# Patient Record
Sex: FEMALE | Race: WHITE | NOT HISPANIC OR LATINO | Employment: FULL TIME | URBAN - METROPOLITAN AREA
[De-identification: names, ages, dates, MRNs, and addresses within clinical notes are randomized per-mention and may not be internally consistent; named-entity substitution may affect disease eponyms.]

---

## 2018-11-13 ENCOUNTER — HOSPITAL ENCOUNTER (EMERGENCY)
Facility: HOSPITAL | Age: 28
Discharge: HOME | End: 2018-11-13
Attending: EMERGENCY MEDICINE | Admitting: EMERGENCY MEDICINE
Payer: COMMERCIAL

## 2018-11-13 ENCOUNTER — APPOINTMENT (EMERGENCY)
Dept: RADIOLOGY | Facility: HOSPITAL | Age: 28
End: 2018-11-13
Payer: COMMERCIAL

## 2018-11-13 VITALS
RESPIRATION RATE: 16 BRPM | HEART RATE: 100 BPM | TEMPERATURE: 98.8 F | BODY MASS INDEX: 26.52 KG/M2 | HEIGHT: 66 IN | WEIGHT: 165 LBS | DIASTOLIC BLOOD PRESSURE: 71 MMHG | OXYGEN SATURATION: 98 % | SYSTOLIC BLOOD PRESSURE: 122 MMHG

## 2018-11-13 DIAGNOSIS — R00.0 SINUS TACHYCARDIA: Primary | ICD-10-CM

## 2018-11-13 LAB
ALBUMIN SERPL BCP-MCNC: 4.1 G/DL (ref 3.5–5)
ALP SERPL-CCNC: 59 U/L (ref 46–116)
ALT SERPL W P-5'-P-CCNC: 15 U/L (ref 12–78)
ANION GAP SERPL CALCULATED.3IONS-SCNC: 11 MMOL/L (ref 4–13)
AST SERPL W P-5'-P-CCNC: 9 U/L (ref 5–45)
ATRIAL RATE: 119 BPM
BASOPHILS # BLD AUTO: 0.05 THOUSANDS/ΜL (ref 0–0.1)
BASOPHILS NFR BLD AUTO: 1 % (ref 0–1)
BILIRUB SERPL-MCNC: 0.3 MG/DL (ref 0.2–1)
BILIRUB UR QL STRIP: NEGATIVE
BUN SERPL-MCNC: 10 MG/DL (ref 5–25)
CALCIUM SERPL-MCNC: 8.7 MG/DL (ref 8.3–10.1)
CHLORIDE SERPL-SCNC: 103 MMOL/L (ref 100–108)
CLARITY UR: NORMAL
CO2 SERPL-SCNC: 25 MMOL/L (ref 21–32)
COLOR UR: YELLOW
CREAT SERPL-MCNC: 0.63 MG/DL (ref 0.6–1.3)
DEPRECATED D DIMER PPP: <270 NG/ML (FEU)
EOSINOPHIL # BLD AUTO: 0.61 THOUSAND/ΜL (ref 0–0.61)
EOSINOPHIL NFR BLD AUTO: 7 % (ref 0–6)
ERYTHROCYTE [DISTWIDTH] IN BLOOD BY AUTOMATED COUNT: 12.9 % (ref 11.6–15.1)
EXT PREG TEST URINE: NORMAL
GFR SERPL CREATININE-BSD FRML MDRD: 123 ML/MIN/1.73SQ M
GLUCOSE SERPL-MCNC: 108 MG/DL (ref 65–140)
GLUCOSE UR STRIP-MCNC: NEGATIVE MG/DL
HCT VFR BLD AUTO: 39.8 % (ref 34.8–46.1)
HGB BLD-MCNC: 13.3 G/DL (ref 11.5–15.4)
HGB UR QL STRIP.AUTO: NEGATIVE
IMM GRANULOCYTES # BLD AUTO: 0.04 THOUSAND/UL (ref 0–0.2)
IMM GRANULOCYTES NFR BLD AUTO: 0 % (ref 0–2)
KETONES UR STRIP-MCNC: NEGATIVE MG/DL
LEUKOCYTE ESTERASE UR QL STRIP: NEGATIVE
LYMPHOCYTES # BLD AUTO: 1.86 THOUSANDS/ΜL (ref 0.6–4.47)
LYMPHOCYTES NFR BLD AUTO: 20 % (ref 14–44)
MCH RBC QN AUTO: 31.7 PG (ref 26.8–34.3)
MCHC RBC AUTO-ENTMCNC: 33.4 G/DL (ref 31.4–37.4)
MCV RBC AUTO: 95 FL (ref 82–98)
MONOCYTES # BLD AUTO: 0.48 THOUSAND/ΜL (ref 0.17–1.22)
MONOCYTES NFR BLD AUTO: 5 % (ref 4–12)
NEUTROPHILS # BLD AUTO: 6.36 THOUSANDS/ΜL (ref 1.85–7.62)
NEUTS SEG NFR BLD AUTO: 67 % (ref 43–75)
NITRITE UR QL STRIP: NEGATIVE
NRBC BLD AUTO-RTO: 0 /100 WBCS
P AXIS: 63 DEGREES
PH UR STRIP.AUTO: 7 [PH] (ref 4.5–8)
PLATELET # BLD AUTO: 278 THOUSANDS/UL (ref 149–390)
PMV BLD AUTO: 11.3 FL (ref 8.9–12.7)
POTASSIUM SERPL-SCNC: 3.2 MMOL/L (ref 3.5–5.3)
PR INTERVAL: 168 MS
PROT SERPL-MCNC: 7.9 G/DL (ref 6.4–8.2)
PROT UR STRIP-MCNC: NEGATIVE MG/DL
QRS AXIS: 85 DEGREES
QRSD INTERVAL: 82 MS
QT INTERVAL: 314 MS
QTC INTERVAL: 441 MS
RBC # BLD AUTO: 4.19 MILLION/UL (ref 3.81–5.12)
SODIUM SERPL-SCNC: 139 MMOL/L (ref 136–145)
SP GR UR STRIP.AUTO: <=1.005 (ref 1–1.03)
T WAVE AXIS: 66 DEGREES
TROPONIN I SERPL-MCNC: <0.02 NG/ML
TSH SERPL DL<=0.05 MIU/L-ACNC: 2.23 UIU/ML (ref 0.36–3.74)
UROBILINOGEN UR QL STRIP.AUTO: 0.2 E.U./DL
VENTRICULAR RATE: 119 BPM
WBC # BLD AUTO: 9.4 THOUSAND/UL (ref 4.31–10.16)

## 2018-11-13 PROCEDURE — 84443 ASSAY THYROID STIM HORMONE: CPT | Performed by: PHYSICIAN ASSISTANT

## 2018-11-13 PROCEDURE — 81003 URINALYSIS AUTO W/O SCOPE: CPT | Performed by: PHYSICIAN ASSISTANT

## 2018-11-13 PROCEDURE — 99285 EMERGENCY DEPT VISIT HI MDM: CPT

## 2018-11-13 PROCEDURE — 93005 ELECTROCARDIOGRAM TRACING: CPT

## 2018-11-13 PROCEDURE — 96361 HYDRATE IV INFUSION ADD-ON: CPT

## 2018-11-13 PROCEDURE — 71046 X-RAY EXAM CHEST 2 VIEWS: CPT

## 2018-11-13 PROCEDURE — 85379 FIBRIN DEGRADATION QUANT: CPT | Performed by: PHYSICIAN ASSISTANT

## 2018-11-13 PROCEDURE — 85025 COMPLETE CBC W/AUTO DIFF WBC: CPT | Performed by: PHYSICIAN ASSISTANT

## 2018-11-13 PROCEDURE — 84484 ASSAY OF TROPONIN QUANT: CPT | Performed by: PHYSICIAN ASSISTANT

## 2018-11-13 PROCEDURE — 36415 COLL VENOUS BLD VENIPUNCTURE: CPT | Performed by: PHYSICIAN ASSISTANT

## 2018-11-13 PROCEDURE — 93010 ELECTROCARDIOGRAM REPORT: CPT | Performed by: INTERNAL MEDICINE

## 2018-11-13 PROCEDURE — 81025 URINE PREGNANCY TEST: CPT | Performed by: PHYSICIAN ASSISTANT

## 2018-11-13 PROCEDURE — 80053 COMPREHEN METABOLIC PANEL: CPT | Performed by: PHYSICIAN ASSISTANT

## 2018-11-13 PROCEDURE — 96360 HYDRATION IV INFUSION INIT: CPT

## 2018-11-13 RX ORDER — POTASSIUM CHLORIDE 20 MEQ/1
40 TABLET, EXTENDED RELEASE ORAL ONCE
Status: COMPLETED | OUTPATIENT
Start: 2018-11-13 | End: 2018-11-13

## 2018-11-13 RX ADMIN — POTASSIUM CHLORIDE 40 MEQ: 1500 TABLET, EXTENDED RELEASE ORAL at 13:12

## 2018-11-13 RX ADMIN — SODIUM CHLORIDE 1000 ML: 0.9 INJECTION, SOLUTION INTRAVENOUS at 12:06

## 2018-11-13 NOTE — ED NOTES
Patient states to feeling lightheaded at this moment  Comes and goes she says  Denies nausea/ vomiting  Patient states that when the chest pressure occurs she feels hot and lightheaded  She feels her heart racing  Patient works in a greenhouse in the office but states that they sprayed chemicals on Friday        Stevie Nash RN  11/13/18 1200

## 2018-11-13 NOTE — DISCHARGE INSTRUCTIONS
Rest, increase fluids  Follow up with cardiologist or family doctor this week for recheck  Return to ER if symptoms worsen  Tachycardia   WHAT YOU NEED TO KNOW:   Tachycardia (fast heart rate) is when your heart rate is 100 beats per minute or more at rest  It is normal for the heart rate to increase with activity or exercise and then decrease when you stop  A fast heart rate at rest may be caused by any of the following:  · Anxiety, stress, or pain    · Fever    · Physical fatigue or strenuous exercise    · Large amounts of caffeine such as coffee, tea, and energy drinks    · Heavy alcohol use or cigarette smoking    · Some medicines, inhalers, or street drugs    · Increased thyroid hormone level  DISCHARGE INSTRUCTIONS:   Call 911 or have someone else call for any of the following:   · You have any of the following signs of a heart attack:     ¨ Squeezing, pressure, or pain in your chest that lasts longer than a few minutes  Chest pain may come and go  ¨ Pain in your jaw, neck, one or both arms, upper and lower back, or stomach  ¨ Shortness of breath, or panting    ¨ Nausea or vomiting    ¨ Lightheadedness    · You cannot be woken  Contact your healthcare provider if:   · Your pulse is faster than your healthcare provider said it should be  · You have frequent periods of a fast heart rate  · You feel weak or dizzy  · You have questions or concerns about your condition or care  Help prevent a fast heart rate:  · Decrease the amount of caffeine you drink  Caffeine can increase your heart rate  · Limit or do not drink alcohol  Alcohol can increase your heart rate  Ask your healthcare provider if it is safe for you to drink alcohol  Also ask how much is safe for you to drink  · Do not smoke  Nicotine and other chemicals in cigarettes can cause damage to your heart  Ask your healthcare provider for information if you currently smoke and need help to quit   E-cigarettes or smokeless tobacco still contain nicotine  Talk to your healthcare provider before you use these products  · Do not use illegal drugs  Drugs such as meth and cocaine can increase your heart rate  Talk to your healthcare provider if you use illegal drugs and want to quit  · Get more rest   Fatigue can cause your heart rate to increase  Ask your healthcare provider about the best exercise plan for you  · Learn ways to cope with stress  Stress, fear, and anxiety can cause a fast heart rate  Your healthcare provider may recommend relaxation techniques and deep breathing exercises  Your healthcare provider may recommend you talk to someone about your stress or anxiety, such as a counselor or a trusted friend  Check your pulse as directed: Your healthcare provider will show you how to check your pulse, and how often to check it  Write down how fast your pulse is and if it feels regular or like it is skipping beats  Also write down the activity you were doing if your heart rate is above 100  Bring the information with you to your follow-up appointment  Follow up with your healthcare provider as directed: You may need more tests  Write down your questions so you remember to ask them at your visit  © 2017 [facility] Information is for End User's use only and may not be sold, redistributed or otherwise used for commercial purposes  All illustrations and images included in CareNotes® are the copyrighted property of A D A M [employer] Inc  or [employer]  The above information is an  only  It is not intended as medical advice for individual conditions or treatments  Talk to your doctor, nurse or pharmacist before following any medical regimen to see if it is safe and effective for you

## 2018-11-13 NOTE — ED PROVIDER NOTES
History  Chief Complaint   Patient presents with    Palpitations     Patient states she has been having palpitations intermittently since Friday  Patient went to PCP for regular visit and her heart rate wa elevated as high as 149  Patient complaisn of chest pressure     Patient is a 34 y/o F with a history of seasonal allergies and anxiety that presents to the ED with heart palpitations that started 3 days ago  She states they have been coming and going  She was seen by her PCP this morning and found to have a heart rate in the 130s and sent to the ER  She states she has chest tightness,but denies SOB  SHe states she used to be on claritin D, but stopped taking it a couple days ago  SHe denies leg swelling or pain  No history of thyroid disease or blood clots  No recent surgeries, long trips, hormone therapy  She states the palpitations have been coming and going and she has been monitoring her heart rate at home and it did not go below 90  She does drink 1-2 cups of coffee a day           History provided by:  Patient  Palpitations   Palpitations quality:  Fast  Onset quality:  Sudden  Duration:  3 days  Timing:  Intermittent  Progression:  Unchanged  Chronicity:  New  Context: anxiety and caffeine    Context: not appetite suppressants and not dehydration    Relieved by:  Nothing  Worsened by:  Nothing  Ineffective treatments:  None tried  Associated symptoms: chest pressure    Associated symptoms: no back pain, no chest pain, no cough, no diaphoresis, no dizziness, no leg pain, no lower extremity edema, no nausea, no near-syncope, no numbness, no shortness of breath, no vomiting and no weakness    Risk factors: OTC sinus medications    Risk factors: no diabetes mellitus, no heart disease, no hx of atrial fibrillation, no hx of DVT, no hx of PE, no hx of thyroid disease, no hypercoagulable state, no hyperthyroidism and no stress        None       Past Medical History:   Diagnosis Date    Migraine History reviewed  No pertinent surgical history  History reviewed  No pertinent family history  I have reviewed and agree with the history as documented  Social History   Substance Use Topics    Smoking status: Never Smoker    Smokeless tobacco: Never Used    Alcohol use Yes        Review of Systems   Constitutional: Negative for diaphoresis and fever  HENT: Negative  Eyes: Negative for visual disturbance  Respiratory: Negative for cough and shortness of breath  Cardiovascular: Positive for palpitations  Negative for chest pain, leg swelling and near-syncope  Gastrointestinal: Negative for abdominal pain, blood in stool, nausea and vomiting  Musculoskeletal: Negative for back pain and neck pain  Skin: Negative for color change and rash  Neurological: Negative for dizziness, tremors, seizures, syncope, facial asymmetry, speech difficulty, weakness, light-headedness, numbness and headaches  Psychiatric/Behavioral: Negative for confusion  All other systems reviewed and are negative  Physical Exam  Physical Exam   Constitutional: She is oriented to person, place, and time  She appears well-developed and well-nourished  She is active and cooperative  She does not appear ill  No distress  HENT:   Head: Normocephalic and atraumatic  Right Ear: Hearing and tympanic membrane normal    Left Ear: Hearing and tympanic membrane normal    Nose: Nose normal    Mouth/Throat: Oropharynx is clear and moist and mucous membranes are normal    Eyes: Conjunctivae are normal    Neck: Normal range of motion  Neck supple  Cardiovascular: Regular rhythm and normal heart sounds  Tachycardia present  No murmur heard  Pulmonary/Chest: Effort normal and breath sounds normal  She has no wheezes  She has no rhonchi  She has no rales  Abdominal: Soft  Normal appearance and bowel sounds are normal  There is no tenderness  Musculoskeletal: Normal range of motion   She exhibits no edema or deformity  Neurological: She is alert and oriented to person, place, and time  She has normal strength  No sensory deficit  Gait normal    Skin: Skin is warm and dry  No rash noted  She is not diaphoretic  No pallor  Psychiatric: Her mood appears anxious  Nursing note and vitals reviewed  Vital Signs  ED Triage Vitals [11/13/18 1147]   Temperature Pulse Respirations Blood Pressure SpO2   98 8 °F (37 1 °C) (!) 134 20 152/81 100 %      Temp src Heart Rate Source Patient Position - Orthostatic VS BP Location FiO2 (%)   -- -- -- -- --      Pain Score       6           Vitals:    11/13/18 1215 11/13/18 1245 11/13/18 1300 11/13/18 1315   BP: 130/73 117/67 113/65 129/70   Pulse: (!) 111 104 (!) 112 (!) 107       Visual Acuity      ED Medications  Medications   sodium chloride 0 9 % bolus 1,000 mL (1,000 mL Intravenous New Bag 11/13/18 1206)   potassium chloride (K-DUR,KLOR-CON) CR tablet 40 mEq (40 mEq Oral Given 11/13/18 1312)       Diagnostic Studies  Results Reviewed     Procedure Component Value Units Date/Time    TSH [[id number]]  (Normal) Collected:  11/13/18 1205    Lab Status:  Final result Specimen:  Blood from Arm, Left Updated:  11/13/18 1245     TSH 3RD GENERATON 2 232 uIU/mL     Narrative:         Patients undergoing fluorescein dye angiography may retain small amounts of fluorescein in the body for 48-72 hours post procedure  Samples containing fluorescein can produce falsely depressed TSH values  If the patient had this procedure,a specimen should be resubmitted post fluorescein clearance            The recommended reference ranges for TSH during pregnancy are as follows:  First trimester 0 1 to 2 5 uIU/mL  Second trimester  0 2 to 3 0 uIU/mL  Third trimester 0 3 to 3 0 uIU/m      Comprehensive metabolic panel [[id number]]  (Abnormal) Collected:  11/13/18 1205    Lab Status:  Final result Specimen:  Blood from Arm, Left Updated:  11/13/18 1242     Sodium 139 mmol/L      Potassium 3 2 (L) mmol/L Chloride 103 mmol/L      CO2 25 mmol/L      ANION GAP 11 mmol/L      BUN 10 mg/dL      Creatinine 0 63 mg/dL      Glucose 108 mg/dL      Calcium 8 7 mg/dL      AST 9 U/L      ALT 15 U/L      Alkaline Phosphatase 59 U/L      Total Protein 7 9 g/dL      Albumin 4 1 g/dL      Total Bilirubin 0 30 mg/dL      eGFR 123 ml/min/1 73sq m     Narrative:         National Kidney Disease Education Program recommendations are as follows:  GFR calculation is accurate only with a steady state creatinine  Chronic Kidney disease less than 60 ml/min/1 73 sq  meters  Kidney failure less than 15 ml/min/1 73 sq  meters      Troponin I [[id number]]  (Normal) Collected:  11/13/18 1205    Lab Status:  Final result Specimen:  Blood from Arm, Left Updated:  11/13/18 1239     Troponin I <0 02 ng/mL     D-Dimer [[id number]]  (Normal) Collected:  11/13/18 1205    Lab Status:  Final result Specimen:  Blood from Arm, Left Updated:  11/13/18 1236     D-Dimer, Quant <270 ng/ml (FEU)     UA w Reflex to Microscopic w Reflex to Culture [[id number]] Collected:  11/13/18 1219    Lab Status:  Final result Specimen:  Urine from Urine, Clean Catch Updated:  11/13/18 1232     Color, UA Yellow     Clarity, UA Slightly Cloudy     Specific Gravity, UA <=1 005     pH, UA 7 0     Leukocytes, UA Negative     Nitrite, UA Negative     Protein, UA Negative mg/dl      Glucose, UA Negative mg/dl      Ketones, UA Negative mg/dl      Urobilinogen, UA 0 2 E U /dl      Bilirubin, UA Negative     Blood, UA Negative    POCT pregnancy, urine [[id number]]  (Normal) Resulted:  11/13/18 1220    Lab Status:  Final result Updated:  11/13/18 1220     EXT PREG TEST UR (Ref: Negative) neg    CBC and differential [[id number]]  (Abnormal) Collected:  11/13/18 1205    Lab Status:  Final result Specimen:  Blood from Arm, Left Updated:  11/13/18 1218     WBC 9 40 Thousand/uL      RBC 4 19 Million/uL      Hemoglobin 13 3 g/dL      Hematocrit 39 8 %      MCV 95 fL      MCH 31 7 pg      MCHC 33 4 g/dL      RDW 12 9 %      MPV 11 3 fL      Platelets 094 Thousands/uL      nRBC 0 /100 WBCs      Neutrophils Relative 67 %      Immat GRANS % 0 %      Lymphocytes Relative 20 %      Monocytes Relative 5 %      Eosinophils Relative 7 (H) %      Basophils Relative 1 %      Neutrophils Absolute 6 36 Thousands/µL      Immature Grans Absolute 0 04 Thousand/uL      Lymphocytes Absolute 1 86 Thousands/µL      Monocytes Absolute 0 48 Thousand/µL      Eosinophils Absolute 0 61 Thousand/µL      Basophils Absolute 0 05 Thousands/µL                  XR chest 2 views   Final Result by Kashmir Schneider MD (11/13 1301)      No acute cardiopulmonary disease  Workstation performed: [id number]                    Procedures  ECG 12 Lead Documentation  Date/Time: 11/13/2018 11:48 AM  Performed by: Pao Floyd by: David Cui     Indications / Diagnosis:  Tachycardia  Patient location:  ED  Previous ECG:     Previous ECG:  Unavailable  Rate:     ECG rate:  119  Rhythm:     Rhythm: sinus tachycardia    Conduction:     Conduction: normal    ST segments:     ST segments:  Normal  T waves:     T waves: normal             Phone Contacts  ED Phone Contact    ED Course                               MDM  Number of Diagnoses or Management Options  Sinus tachycardia: new and requires workup  Diagnosis management comments: Patient with sinus tachycardia, will check labs, thyroid, ekg, to r/o electrolyte abnormality, thyroid disease, infection  Patient's heart rate did improve while in the ER, will refer to cardiologist for possible holter monitor  D/w patient could be related to anxiety          Amount and/or Complexity of Data Reviewed  Clinical lab tests: ordered and reviewed  Tests in the radiology section of CPT®: ordered and reviewed  Independent visualization of images, tracings, or specimens: yes    Patient Progress  Patient progress: improved    CritCare Time    Disposition  Final diagnoses: Sinus tachycardia     Time reflects when diagnosis was documented in both MDM as applicable and the Disposition within this note     Time User Action Codes Description Comment    11/13/2018  1:36 PM Patti Valenzuela Add [R00 0] Sinus tachycardia       ED Disposition     ED Disposition Condition Comment    Discharge  Kisha Moran discharge to home/self care  Condition at discharge: Stable        Follow-up Information     Follow up With Specialties Details Why Jerman Mcdermott MD Cardiology Call today For recheck and further evaluation [address] [place] [address] [address] [place] [address]  [phone]            Patient's Medications    No medications on file     No discharge procedures on file      ED Provider  Electronically Signed by           Gloria Gaona PA-C  11/13/18 4091

## 2018-12-14 NOTE — PROGRESS NOTES
Cardiology Consultation     Haile Quintanilla  [id number]  1990  HEART & VASCULAR  [facility] Storm Cooley  [address] [place], [place] [id number]  1  Palpitation     2  Tachycardia       There is no problem list on file for this patient  HPI patient is here for a cardiology evaluation  She has a history of palpitations  She was recently seen in the emergency room in November  Her potassium was noted to be 3 2 when evaluated  EKG demonstrated sinus tachycardia at a rate of 119 and otherwise a normal tracing  TSH done during that visit was normal    She does have a history of seasonal allergies anxiety  Blood pressure today is good  We did do orthostatics and the blood pressure actually went up a bit upon standing  She previously was taking Claritin-D which is been discontinued  She was given metoprolol to use on a p r n  basis  She has not had to use this  She apparently had a Holter monitor done at the [facility] but I do not have access the results  She will forward this to me  She has felt better since the emergency room visit  She is here today with her mother  Her mother has a history of paroxysmal atrial fibrillation in the setting of thyroid disease  PMH-  Past Medical History:   Diagnosis Date    Migraine         SOCIAL HISTORY-  Social History     Social History    Marital status: Single     Spouse name: N/A    Number of children: N/A    Years of education: N/A     Occupational History    Not on file  Social History Main Topics    Smoking status: Never Smoker    Smokeless tobacco: Never Used    Alcohol use Yes    Drug use: No    Sexual activity: Not on file     Other Topics Concern    Not on file     Social History Narrative    No narrative on file        FAMILY HISTORY-  No family history on file  SURGICAL HISTORY-  No past surgical history on file        Current Outpatient Prescriptions:   Vicenta Moyer Oil OIL, by Does not apply route, Disp: , Rfl:     magnesium 30 MG tablet, Take 30 mg by mouth 2 (two) times a day, Disp: , Rfl:     metoprolol tartrate (LOPRESSOR) 25 mg tablet, TAKE 1 TABLET BY ORAL ROUTE 2 TIMES EVERY DAY AS NEEDED TACHYCARDIA, Disp: , Rfl: 0    peppermint oil, by Does not apply route as needed, Disp: , Rfl:     Probiotic Product (PROBIOTIC PO), Take by mouth, Disp: , Rfl:     prochlorperazine (COMPAZINE) 10 mg tablet, Take 10 mg by mouth every 8 (eight) hours as needed, Disp: , Rfl: 0    SUMAtriptan (IMITREX) 100 mg tablet, PLEASE SEE ATTACHED FOR DETAILED DIRECTIONS, Disp: , Rfl: 2  Allergies   Allergen Reactions    Pollen Extract      Vitals:    12/19/18 1330   BP: 108/70   BP Location: Right arm   Patient Position: Sitting   Cuff Size: Large   Pulse: 64   Weight: 73 9 kg (163 lb)   Height: 5' 6" (1 676 m)         Review of Systems:  Review of Systems   Cardiovascular: Positive for palpitations  All other systems reviewed and are negative  Physical Exam:  Physical Exam   Constitutional: She is oriented to person, place, and time  She appears well-developed and well-nourished  HENT:   Head: Normocephalic and atraumatic  Eyes: Pupils are equal, round, and reactive to light  Conjunctivae and EOM are normal    Neck: Normal range of motion  Neck supple  Cardiovascular: Normal rate and normal heart sounds  Pulmonary/Chest: Effort normal and breath sounds normal    Neurological: She is alert and oriented to person, place, and time  Skin: Skin is warm and dry  Psychiatric: She has a normal mood and affect  Vitals reviewed  Discussion/Summary: At this point I will check an echocardiogram   I was going to order a Holter monitor but she apparently had one done already and I will review the results of this  The patient will forward a copy of this to me  I have asked her to reduce her caffeine use which she has already done  She no longer uses Claritin-D    I do not think she needs a stress test at this point  I have asked her to call if there is a problem in the interim otherwise I will see her in follow-up in six months time

## 2018-12-19 ENCOUNTER — TRANSCRIBE ORDERS (OUTPATIENT)
Dept: ADMINISTRATIVE | Facility: HOSPITAL | Age: 28
End: 2018-12-19

## 2018-12-19 ENCOUNTER — OFFICE VISIT (OUTPATIENT)
Dept: CARDIOLOGY CLINIC | Facility: CLINIC | Age: 28
End: 2018-12-19
Payer: COMMERCIAL

## 2018-12-19 VITALS
WEIGHT: 163 LBS | SYSTOLIC BLOOD PRESSURE: 108 MMHG | BODY MASS INDEX: 26.2 KG/M2 | DIASTOLIC BLOOD PRESSURE: 70 MMHG | HEIGHT: 66 IN | HEART RATE: 64 BPM

## 2018-12-19 DIAGNOSIS — R00.0 TACHYCARDIA: ICD-10-CM

## 2018-12-19 DIAGNOSIS — R00.2 PALPITATION: Primary | ICD-10-CM

## 2018-12-19 PROCEDURE — 99244 OFF/OP CNSLTJ NEW/EST MOD 40: CPT | Performed by: INTERNAL MEDICINE

## 2018-12-19 RX ORDER — MAGNESIUM 30 MG
30 TABLET ORAL 2 TIMES DAILY
COMMUNITY

## 2018-12-19 RX ORDER — PEPPERMINT OIL
OIL (ML) MISCELLANEOUS AS NEEDED
COMMUNITY

## 2018-12-19 RX ORDER — SUMATRIPTAN 100 MG/1
TABLET, FILM COATED ORAL
Refills: 2 | COMMUNITY
Start: 2018-11-07

## 2018-12-19 RX ORDER — SALICYLIC ACID
POWDER (GRAM) MISCELLANEOUS
COMMUNITY

## 2018-12-19 RX ORDER — PROCHLORPERAZINE MALEATE 10 MG
10 TABLET ORAL EVERY 8 HOURS PRN
Refills: 0 | COMMUNITY
Start: 2018-10-31

## 2018-12-19 NOTE — PATIENT INSTRUCTIONS
I will order an echocardiogram   Please forward a copy of the Holter monitor to me  Please call if there is a problem in the interim otherwise I will see you at the follow-up visit

## 2019-01-04 ENCOUNTER — HOSPITAL ENCOUNTER (OUTPATIENT)
Dept: NON INVASIVE DIAGNOSTICS | Facility: CLINIC | Age: 29
Discharge: HOME | End: 2019-01-04
Payer: COMMERCIAL

## 2019-01-04 DIAGNOSIS — R00.2 PALPITATION: ICD-10-CM

## 2019-01-04 DIAGNOSIS — R00.0 TACHYCARDIA: ICD-10-CM

## 2019-01-04 PROCEDURE — 93306 TTE W/DOPPLER COMPLETE: CPT

## 2019-01-04 PROCEDURE — 93306 TTE W/DOPPLER COMPLETE: CPT | Performed by: INTERNAL MEDICINE

## 2019-07-09 NOTE — PROGRESS NOTES
Cardiology Follow Up    Zoila Crenshaw  1990  [id number]  [facility] [address]  [phone]    1  Palpitation  POCT ECG    Stress test only, exercise    CANCELED: POCT ECG   2  Tachycardia  Stress test only, exercise   3  Chest pain, unspecified type  POCT ECG    Stress test only, exercise       Interval History:  Patient is here for a follow-up visit  She was last seen by me in December of last year  Since that time she had an echocardiogram done in January which demonstrated preserved LV systolic function and no significant valvular heart disease  Prior Holter monitor done in November 14th of last year demonstrated an average heart rate of 96 with a maximal heart rate of 165  There were 957 PVCs with 85 ventricular couplets noted  There were 596 PACs with a 56 second episode of SVT at a rate of 145  The morphology of this is unclear  Since this study was done the patient did modify her caffeine intake  As well she was taking Claritin-D  As well she was hypokalemic at the time the study was done  EKG today demonstrates sinus rhythm at a rate of 83 with no significant change compared to a prior tracing done November 14, 2018 except the rate is better controlled today  She has had intermittent atypical chest discomfort  I have a low index of suspicion but she does have concerns  I will likely schedule a stress test   Her EKG as noted shows no ischemic change  She has had little issue with palpitation since modifying her caffeine use and discontinuing Claritin-D  There is no problem list on file for this patient      Past Medical History:   Diagnosis Date    Migraine      Social History     Socioeconomic History    Marital status: Single     Spouse name: Not on file    Number of children: Not on file    Years of education: Not on file    Highest education level: Not on file   Occupational History    Not on file   Social Needs    Financial resource strain: Not on file    Food insecurity:     Worry: Not on file     Inability: Not on file    Transportation needs:     Medical: Not on file     Non-medical: Not on file   Tobacco Use    Smoking status: Never Smoker    Smokeless tobacco: Never Used   Substance and Sexual Activity    Alcohol use: Yes    Drug use: No    Sexual activity: Not on file   Lifestyle    Physical activity:     Days per week: Not on file     Minutes per session: Not on file    Stress: Not on file   Relationships    Social connections:     Talks on phone: Not on file     Gets together: Not on file     Attends Orthodox service: Not on file     Active member of club or organization: Not on file     Attends meetings of clubs or organizations: Not on file     Relationship status: Not on file    Intimate partner violence:     Fear of current or ex partner: Not on file     Emotionally abused: Not on file     Physically abused: Not on file     Forced sexual activity: Not on file   Other Topics Concern    Not on file   Social History Narrative    Not on file      No family history on file  No past surgical history on file      Current Outpatient Medications:     Lavender Oil OIL, by Does not apply route, Disp: , Rfl:     magnesium 30 MG tablet, Take 30 mg by mouth 2 (two) times a day, Disp: , Rfl:     metoprolol tartrate (LOPRESSOR) 25 mg tablet, TAKE 1 TABLET BY ORAL ROUTE 2 TIMES EVERY DAY AS NEEDED TACHYCARDIA, Disp: , Rfl: 0    peppermint oil, by Does not apply route as needed, Disp: , Rfl:     Probiotic Product (PROBIOTIC PO), Take by mouth, Disp: , Rfl:     prochlorperazine (COMPAZINE) 10 mg tablet, Take 10 mg by mouth every 8 (eight) hours as needed, Disp: , Rfl: 0    SUMAtriptan (IMITREX) 100 mg tablet, PLEASE SEE ATTACHED FOR DETAILED DIRECTIONS, Disp: , Rfl: 2  Allergies   Allergen Reactions    Claritin [Loratadine]      Causes fast heart rate     Pollen Extract        Labs:not applicable  Imaging: No results found  Review of Systems:  Review of Systems   All other systems reviewed and are negative  Physical Exam:  /78 (BP Location: Left arm, Patient Position: Sitting, Cuff Size: Large)   Pulse 88   Ht 5' 6" (1 676 m)   Wt 74 4 kg (164 lb)   BMI 26 47 kg/m²   Physical Exam   Constitutional: She is oriented to person, place, and time  She appears well-developed and well-nourished  HENT:   Head: Normocephalic and atraumatic  Eyes: Pupils are equal, round, and reactive to light  Conjunctivae and EOM are normal    Neck: Normal range of motion  Neck supple  Cardiovascular: Normal rate and normal heart sounds  Pulmonary/Chest: Effort normal and breath sounds normal    Neurological: She is alert and oriented to person, place, and time  Skin: Skin is warm and dry  Psychiatric: She has a normal mood and affect  Vitals reviewed  Discussion/Summary:  Will schedule a treadmill test at her convenience  I have asked her to call in the interim if there is a problem otherwise I will see her in six months time

## 2019-07-12 ENCOUNTER — OFFICE VISIT (OUTPATIENT)
Dept: CARDIOLOGY CLINIC | Facility: CLINIC | Age: 29
End: 2019-07-12
Payer: COMMERCIAL

## 2019-07-12 VITALS
HEART RATE: 88 BPM | BODY MASS INDEX: 26.36 KG/M2 | DIASTOLIC BLOOD PRESSURE: 78 MMHG | HEIGHT: 66 IN | SYSTOLIC BLOOD PRESSURE: 110 MMHG | WEIGHT: 164 LBS

## 2019-07-12 DIAGNOSIS — R00.2 PALPITATION: Primary | ICD-10-CM

## 2019-07-12 DIAGNOSIS — R07.9 CHEST PAIN, UNSPECIFIED TYPE: ICD-10-CM

## 2019-07-12 DIAGNOSIS — R00.0 TACHYCARDIA: ICD-10-CM

## 2019-07-12 PROCEDURE — 93000 ELECTROCARDIOGRAM COMPLETE: CPT | Performed by: INTERNAL MEDICINE

## 2019-07-12 PROCEDURE — 99214 OFFICE O/P EST MOD 30 MIN: CPT | Performed by: INTERNAL MEDICINE

## 2021-05-09 ENCOUNTER — IMMUNIZATIONS (OUTPATIENT)
Dept: FAMILY MEDICINE CLINIC | Facility: HOSPITAL | Age: 31
End: 2021-05-09

## 2021-05-09 DIAGNOSIS — Z23 ENCOUNTER FOR IMMUNIZATION: Primary | ICD-10-CM

## 2021-05-09 PROCEDURE — 0001A SARS-COV-2 / COVID-19 MRNA VACCINE (PFIZER-BIONTECH) 30 MCG: CPT

## 2021-05-09 PROCEDURE — 91300 SARS-COV-2 / COVID-19 MRNA VACCINE (PFIZER-BIONTECH) 30 MCG: CPT

## 2021-06-05 ENCOUNTER — IMMUNIZATIONS (OUTPATIENT)
Dept: FAMILY MEDICINE CLINIC | Facility: HOSPITAL | Age: 31
End: 2021-06-05

## 2021-06-05 DIAGNOSIS — Z23 ENCOUNTER FOR IMMUNIZATION: Primary | ICD-10-CM

## 2021-06-05 PROCEDURE — 0002A SARS-COV-2 / COVID-19 MRNA VACCINE (PFIZER-BIONTECH) 30 MCG: CPT

## 2021-06-05 PROCEDURE — 91300 SARS-COV-2 / COVID-19 MRNA VACCINE (PFIZER-BIONTECH) 30 MCG: CPT
